# Patient Record
Sex: FEMALE | Race: WHITE
[De-identification: names, ages, dates, MRNs, and addresses within clinical notes are randomized per-mention and may not be internally consistent; named-entity substitution may affect disease eponyms.]

---

## 2018-05-08 ENCOUNTER — HOSPITAL ENCOUNTER (EMERGENCY)
Dept: HOSPITAL 62 - ER | Age: 63
LOS: 1 days | Discharge: ANOTHER HOSPITAL | End: 2018-05-09
Payer: COMMERCIAL

## 2018-05-08 DIAGNOSIS — R11.2: ICD-10-CM

## 2018-05-08 DIAGNOSIS — R19.7: ICD-10-CM

## 2018-05-08 DIAGNOSIS — R79.89: ICD-10-CM

## 2018-05-08 DIAGNOSIS — R10.13: ICD-10-CM

## 2018-05-08 DIAGNOSIS — R10.11: ICD-10-CM

## 2018-05-08 DIAGNOSIS — K80.20: ICD-10-CM

## 2018-05-08 DIAGNOSIS — K85.90: Primary | ICD-10-CM

## 2018-05-08 LAB
ABSOLUTE LYMPHOCYTES# (MANUAL): 1.1 10^3/UL (ref 0.5–4.7)
ABSOLUTE MONOCYTES # (MANUAL): 2.2 10^3/UL (ref 0.1–1.4)
ABSOLUTE NEUTROPHILS# (MANUAL): 24.4 10^3/UL (ref 1.7–8.2)
ADD MANUAL DIFF: YES
ALBUMIN SERPL-MCNC: 4.4 G/DL (ref 3.5–5)
ALP SERPL-CCNC: 111 U/L (ref 38–126)
ALT SERPL-CCNC: 260 U/L (ref 9–52)
ANION GAP SERPL CALC-SCNC: 17 MMOL/L (ref 5–19)
ANISOCYTOSIS BLD QL SMEAR: SLIGHT
APPEARANCE UR: (no result)
APTT PPP: YELLOW S
AST SERPL-CCNC: 546 U/L (ref 14–36)
BASOPHILS NFR BLD MANUAL: 0 % (ref 0–2)
BILIRUB DIRECT SERPL-MCNC: 0.8 MG/DL (ref 0–0.4)
BILIRUB SERPL-MCNC: 1.4 MG/DL (ref 0.2–1.3)
BILIRUB UR QL STRIP: NEGATIVE
BUN SERPL-MCNC: 22 MG/DL (ref 7–20)
CALCIUM: 9.3 MG/DL (ref 8.4–10.2)
CHLORIDE SERPL-SCNC: 104 MMOL/L (ref 98–107)
CO2 SERPL-SCNC: 26 MMOL/L (ref 22–30)
EOSINOPHIL NFR BLD MANUAL: 0 % (ref 0–6)
ERYTHROCYTE [DISTWIDTH] IN BLOOD BY AUTOMATED COUNT: 15.6 % (ref 11.5–14)
GLUCOSE SERPL-MCNC: 286 MG/DL (ref 75–110)
GLUCOSE UR STRIP-MCNC: NEGATIVE MG/DL
HCT VFR BLD CALC: 46.2 % (ref 36–47)
HGB BLD-MCNC: 15 G/DL (ref 12–15.5)
KETONES UR STRIP-MCNC: NEGATIVE MG/DL
LIPASE SERPL-CCNC: (no result) U/L (ref 23–300)
MCH RBC QN AUTO: 27.9 PG (ref 27–33.4)
MCHC RBC AUTO-ENTMCNC: 32.3 G/DL (ref 32–36)
MCV RBC AUTO: 86 FL (ref 80–97)
MONOCYTES % (MANUAL): 8 % (ref 3–13)
NITRITE UR QL STRIP: NEGATIVE
PH UR STRIP: 5 [PH] (ref 5–9)
PLATELET # BLD: 518 10^3/UL (ref 150–450)
PLATELET COMMENT: (no result)
POTASSIUM SERPL-SCNC: 4.5 MMOL/L (ref 3.6–5)
PROT SERPL-MCNC: 7.7 G/DL (ref 6.3–8.2)
PROT UR STRIP-MCNC: >=500 MG/DL
RBC # BLD AUTO: 5.37 10^6/UL (ref 3.72–5.28)
SEGMENTED NEUTROPHILS % (MAN): 88 % (ref 42–78)
SODIUM SERPL-SCNC: 146.9 MMOL/L (ref 137–145)
SP GR UR STRIP: 1.02
TOTAL CELLS COUNTED BLD: 100
TOXIC GRANULES BLD QL SMEAR: SLIGHT
UROBILINOGEN UR-MCNC: NEGATIVE MG/DL (ref ?–2)
VARIANT LYMPHS NFR BLD MANUAL: 4 % (ref 13–45)
WBC # BLD AUTO: 27.7 10^3/UL (ref 4–10.5)

## 2018-05-08 PROCEDURE — 81001 URINALYSIS AUTO W/SCOPE: CPT

## 2018-05-08 PROCEDURE — 87088 URINE BACTERIA CULTURE: CPT

## 2018-05-08 PROCEDURE — 96376 TX/PRO/DX INJ SAME DRUG ADON: CPT

## 2018-05-08 PROCEDURE — 74177 CT ABD & PELVIS W/CONTRAST: CPT

## 2018-05-08 PROCEDURE — 80053 COMPREHEN METABOLIC PANEL: CPT

## 2018-05-08 PROCEDURE — 96361 HYDRATE IV INFUSION ADD-ON: CPT

## 2018-05-08 PROCEDURE — 85025 COMPLETE CBC W/AUTO DIFF WBC: CPT

## 2018-05-08 PROCEDURE — 87186 SC STD MICRODIL/AGAR DIL: CPT

## 2018-05-08 PROCEDURE — 83690 ASSAY OF LIPASE: CPT

## 2018-05-08 PROCEDURE — 74022 RADEX COMPL AQT ABD SERIES: CPT

## 2018-05-08 PROCEDURE — 96365 THER/PROPH/DIAG IV INF INIT: CPT

## 2018-05-08 PROCEDURE — 87086 URINE CULTURE/COLONY COUNT: CPT

## 2018-05-08 PROCEDURE — 99285 EMERGENCY DEPT VISIT HI MDM: CPT

## 2018-05-08 PROCEDURE — 96375 TX/PRO/DX INJ NEW DRUG ADDON: CPT

## 2018-05-08 PROCEDURE — 76705 ECHO EXAM OF ABDOMEN: CPT

## 2018-05-08 PROCEDURE — 87077 CULTURE AEROBIC IDENTIFY: CPT

## 2018-05-08 PROCEDURE — 36415 COLL VENOUS BLD VENIPUNCTURE: CPT

## 2018-05-08 PROCEDURE — 87040 BLOOD CULTURE FOR BACTERIA: CPT

## 2018-05-08 PROCEDURE — 83605 ASSAY OF LACTIC ACID: CPT

## 2018-05-08 NOTE — RADIOLOGY REPORT (SQ)
EXAM DESCRIPTION:  U/S ABDOMEN LIMITED W/O DOP



COMPLETED DATE/TIME:  5/8/2018 7:16 pm



REASON FOR STUDY:  RUQ pain and elevated liver and panc enzymes



COMPARISON:  None.



TECHNIQUE:  Dynamic and static grayscale images acquired of the abdomen and recorded on PACS. Additio
geraldine selected color Doppler and spectral images recorded.



LIMITATIONS:  Overlying bowel gas limits evaluation.



FINDINGS:  PANCREAS: Not seen.

LIVER: 16 cm.  Increased echogenicity.

LIVER VASCULATURE: Normal directional flow of the main portal vein and hepatic veins.

GALLBLADDER: Small gallstones are present.

ULTRASOUND-DETECTED POLLOCK'S SIGN: Negative.

INTRAHEPATIC DUCTS AND COMMON DUCT: CBD and intrahepatic ducts normal caliber. No filling defects.

INFERIOR VENA CAVA: Not well seen.

AORTA: Not well seen.  There is no aneurysm in the midportion of the abdominal aorta.

RIGHT KIDNEY:  Normal size, 11 cm. Normal echogenicity. No solid or suspicious masses. No hydronephro
sis. No calcifications.

PERITONEAL AND RIGHT PLEURAL SPACE: No ascites or effusions.

OTHER: No other significant findings.



IMPRESSION:  Fatty infiltration of the liver.  Cholelithiasis.



TECHNICAL DOCUMENTATION:  JOB ID:  [id number]

 2011 [employer]- All Rights Reserved



Reading location - IP/workstation name: [email or web address]

## 2018-05-08 NOTE — RADIOLOGY REPORT (SQ)
EXAM DESCRIPTION:  ACUTE ABDOMEN SERIES



COMPLETED DATE/TIME:  5/8/2018 6:20 pm



REASON FOR STUDY:  EPIGASTRIC pain, tenderness



COMPARISON:  None.



NUMBER OF VIEWS:  Three views.



TECHNIQUE:  Frontal chest, supine abdomen and upright/ abdomen radiographic images acquired.



LIMITATIONS:  None.



FINDINGS:  CHEST: Lungs clear of infiltrates.

FREE AIR: None. No abnormal gas collections.

BOWEL GAS PATTERN: Nonobstructive pattern. No dilated loops or air fluid levels.

CALCIFICATIONS: 1 cm calcification in the region of the upper pole the left kidney.  Large irregular 
calcification in the right side of the pelvis not likely to be in the ureter.

HARDWARE: None in the abdomen.

SOFT TISSUES: No gross mass or suggestion of organomegaly.

BONES: No acute fracture.  No worrisome bone lesions.

OTHER: No other significant finding.



IMPRESSION:  Possible calyceal calculus in the left kidney.



TECHNICAL DOCUMENTATION:  JOB ID:  [id number]

 2011 "[employer]- All Rights Reserved



Reading location - IP/workstation name: [email or web address]

## 2018-05-08 NOTE — RADIOLOGY REPORT (SQ)
EXAM DESCRIPTION:  CT ABD/PELVIS WITH IV ONLY



COMPLETED DATE/TIME:  5/8/2018 7:34 pm



REASON FOR STUDY:  epigastric pain



COMPARISON:  None.



TECHNIQUE:  CT scan of the abdomen and pelvis performed using helical scanning technique with dynamic
 intravenous contrast injection.  No oral contrast. Images reviewed with lung, soft tissue, and bone 
windows. Reconstructed coronal and sagittal MPR images reviewed. Delayed images for evaluation of the
 urinary system also acquired. All images stored on PACS.

All CT scanners at this facility use dose modulation, iterative reconstruction, and/or weight based d
osing when appropriate to reduce radiation dose to as low as reasonably achievable (ALARA).

[facility]: Dose Right  [facility]: CareDose    [facility]: Dose Right    [facility]: Teradose 4D    [facility]: Smart Technologies



CONTRAST TYPE AND DOSE:  contrast/concentration: Isovue 300.00 mg/ml; Total Contrast Delivered: 100.0
 ml; Total Saline Delivered: 72.0 ml



RENAL FUNCTION:  BUN 22 creatinine 1.5



RADIATION DOSE:  CT Rad equipment meets quality standard of care and radiation dose reduction techniq
ues were employed. CTDIvol: 20.3 - 23.8 mGy. DLP: 2311 mGy-cm..



LIMITATIONS:  None.



FINDINGS:  LOWER CHEST: No significant findings. No nodules or infiltrates.

LIVER: The liver is uniformly low in density.

SPLEEN: Normal size. No focal lesions.

PANCREAS: There is stranding in the peripancreatic fat.  There is some free fluid around the tail of 
the pancreas particularly.  There is edema in the pancreas.

GALLBLADDER: No identified stones by CT criteria. No inflammatory changes to suggest cholecystitis.

ADRENAL GLANDS: No significant masses or asymmetry.

RIGHT KIDNEY AND URETER: The right kidney is hypoplastic.   No significant calcifications.   No hydro
nephrosis or hydroureter.

LEFT KIDNEY AND URETER: No solid masses.   There is a 10 x 12 mm upper calyceal calculus.   No hydron
ephrosis or hydroureter.

AORTA AND VESSELS: No aneurysm. No dissection. Renal arteries, SMA, celiac without stenosis.

RETROPERITONEUM: No retroperitoneal adenopathy, hemorrhage or masses.

BOWEL AND PERITONEAL CAVITY: Diverticulosis coli with no acute inflammatory changes.

APPENDIX: Not identified.

PELVIS: No mass.  No free fluid. Normal bladder.

ABDOMINAL WALL: No masses. No hernias.

BONES: No significant or acute findings.

OTHER: No other significant finding.



IMPRESSION:  1.  Acute pancreatitis with a large amount of peripancreatic and pancreatic edema.  Free
 fluid in the mesentery.

2.  Fatty infiltration of the liver.

3.  Hypoplastic right kidney.

4.  Large upper calyceal calculus in the left kidney.

5.  Diverticulosis coli.



TECHNICAL DOCUMENTATION:  JOB ID:  [id number]

Quality ID # 436: Final reports with documentation of one or more dose reduction techniques (e.g., Au
tomated exposure control, adjustment of the mA and/or kV according to patient size, use of iterative 
reconstruction technique)

 2011 "[employer], [place]"- All Rights Reserved



Reading location - IP/workstation name: [email or web address]

## 2018-05-08 NOTE — ER DOCUMENT REPORT
ED General





<KIARRA SCOTT - Last Filed: 05/09/18 03:27>





- General


TRAVEL OUTSIDE OF THE [place] IN LAST 30 DAYS: No





<SOY BUCKLEY - Last Filed: 05/12/18 07:09>





- General


Chief Complaint: Nausea/Vomiting/Diarrhea


Stated Complaint: VOMITING, NECK/STOMACH PAIN


Time Seen by Provider: 05/08/18 16:26


Notes: 





Patient is a 63-year-old female who was referred to the emergency department by 

her primary care with a chief complaint of nausea, vomiting that started at 4 

AM.  She states that she initially woke up with nausea then developed 

epigastric pain radiating to her right and upper quadrants and into her back.  

She admits to a burning cramping stabbing pain in the epigastric area.  She 

admits to vomiting all day not being able to tolerate p.o.  She denies any 

fevers or chills.  She denies any coffee-ground emesis, hematemesis.  Admits to 

1-2  loose bowel movements.  She denies a history of gallbladder disease.





Past medical history significant for esophageal reflux that she does not take 

any specific medication for it just avoids irritating foods.  History of 

hypertension on lisinopril


Past surgical history significant for previous kidney stone removal


Social history: Denies any tobacco, alcohol or drug use





Primary care is Dr. Bassett (SOY BUCKLEY)





- Related Data


Allergies/Adverse Reactions: 


 





No Known Allergies Allergy (Verified 05/08/18 14:45)


 











Past Medical History





- Social History


Smoking Status: Never Smoker


Family History: Reviewed & Not Pertinent





<SOY BUCKLEY - Last Filed: 05/12/18 07:09>





Review of Systems





- Review of Systems


Constitutional: No symptoms reported


Cardiovascular: No symptoms reported


Respiratory: No symptoms reported


Gastrointestinal: See HPI


Genitourinary: No symptoms reported


Musculoskeletal: No symptoms reported


Neurological/Psychological: No symptoms reported


-: Yes All other systems reviewed and negative





<SOY BUCKLEY - Last Filed: 05/12/18 07:09>





Physical Exam





<KIARRA SCOTT - Last Filed: 05/09/18 03:27>





<SOY BUCKLEY - Last Filed: 05/12/18 07:09>





- Vital signs


Vitals: 


 











Temp Pulse Resp BP Pulse Ox


 


 97.7 F   97   20   101/74   98 


 


 05/08/18 14:48  05/08/18 14:48  05/08/18 14:48  05/08/18 14:48  05/08/18 14:48














- Notes


Notes: 





PHYSICAL EXAM


GENERAL: Alert, irritable due to discomfort but very cooperative. Pain 

tolerable sitting up, worse supine


HEAD: Normocephalic, atraumatic.


EYES: Pupils equal, round, and reactive to light. Extraocular movements intact.


ENT: Oral mucosa moist, tongue midline. 


NECK: Full range of motion. Supple. Trachea midline.


LUNGS: Clear to auscultation bilaterally, no wheezes, rales, or rhonchi. No 

respiratory distress.


HEART: Regular rate and rhythm. No murmurs, gallops, or rubs.


ABDOMEN: Soft,  nondistended, epigastric and RUQ tenderness. No guarding, 

rebound, or rigidity.. Bowel sounds present in all 4 quadrants.


EXTREMITIES: Moves all 4 extremities spontaneously. No edema, radial and 

dorsalis pedis pulses 2/4 bilaterally. No cyanosis. 


NEUROLOGICAL: Alert and oriented x4. Normal speech.


PSYCH: Normal affect, normal mood.


SKIN: Warm, dry, normal turgor. No rashes or lesions noted.


 (SOY BUCKLEY)





Course





- Laboratory


Result Diagrams: 


 05/08/18 16:21





 05/08/18 16:21





<KIARRA SCOTT - Last Filed: 05/09/18 03:27>





- Laboratory


Result Diagrams: 


 05/08/18 16:21





 05/08/18 16:21





<SOY BUCKLEY - Last Filed: 05/12/18 07:09>





- Re-evaluation


Re-evalutation: 








05/08/18 20:15


Ultrasound shows cholelithiasis, CAT scan showing pancreatitis with 

peripancreatic fluid.  Elevated LFTs, significantly elevated lipase, somewhat 

elevated bilirubin.  Concerning for gallstone pancreatitis.  Dr. Bassett called 

me and has already reviewed the results, wants to make sure that I saw them 

will discuss with patient and family for transfer due to lack of GI coverage to 

perform ERCP. Covering with Invanz.  Patient reports she is much improved after 

pain/nausea medication and IV fluids.  She is not tachycardic, hypotensive, or 

febrile.  Remaining labs are still pending.  Requests [place] transfer.  

Transfer center has been contacted, pending call back.





05/08/18 20:45


Spoke with Dr. Whitman, Hospitalist, patient will be accepted for transfer.





Urinalysis shows infection, culture placed, blood culture already has been 

placed, antibiotics have already started infusing.





05/09/18 01:20


Patient has been reevaluated twice, vital signs remain unremarkable with no 

hypotension, tachycardia.  Patient began having a lot of pain again and had to 

be remedicated.  Patient has a bed assignment, transport should be here in 2 

hours.





05/09/18 02:45


EMS team is almost here, patient is alert and well-appearing, has not had any 

vomiting, vital signs unchanged, stable for transport.


 (KIARRA SCOTT)





05/08/18 18:27


Patient is a 63-year-old female who presents with epigastric pain.  Patient is 

afebrile, hemodynamically stable.  She is visibly uncomfortable.  States that 

Toradol, fentanyl not improving her pain.  Patient's CBC did return with a 

leukocytosis of 22.5.  Chemistry was delayed I did have to call for the 

results.  Patient with stable chemistry however her lipase was severely 

elevated that required dilution therefore delay in results being released.  

They have now been released which shows elevations in LFTs and lipase which is 

concerning for common bile duct stone.  She will be sent for a CT the abdomen 

pelvis with IV contrast as well as an ultrasound of the right upper quadrant.  

Patient will be kept n.p.o. at this time.  We will continue to administer IV 

fluids and pain medication.


05/08/18 18:45


KUB without evidence of free air in the abdomen (SOY BUCKLEY)





- Vital Signs


Vital signs: 


 











Temp Pulse Resp BP Pulse Ox


 


 98.8 F   84   20   123/89 H  97 


 


 05/08/18 19:55  05/08/18 19:55  05/09/18 02:31  05/09/18 02:31  05/09/18 02:31














- Laboratory


Laboratory results interpreted by me: 


 











  05/08/18 05/08/18 05/08/18





  16:21 16:21 19:40


 


WBC  27.7 H  


 


RBC  5.37 H  


 


RDW  15.6 H  


 


Plt Count  518 H  


 


Seg Neuts % (Manual)  88 H  


 


Lymphocytes % (Manual)  4 L  


 


Abs Neuts (Manual)  24.4 H  


 


Abs Monocytes (Manual)  2.2 H  


 


Sodium   146.9 H 


 


BUN   22 H 


 


Creatinine   1.51 H 


 


Est GFR ( Amer)   42 L 


 


Est GFR (Non-Af Amer)   35 L 


 


Glucose   286 H 


 


Lactic Acid    3.7 H


 


Total Bilirubin   1.4 H 


 


Direct Bilirubin   0.8 H 


 


AST   546 H 


 


ALT   260 H 


 


Lipase   86037.6 H 


 


Urine Protein   


 


Urine Blood   


 


Ur Leukocyte Esterase   














  05/08/18





  19:58


 


WBC 


 


RBC 


 


RDW 


 


Plt Count 


 


Seg Neuts % (Manual) 


 


Lymphocytes % (Manual) 


 


Abs Neuts (Manual) 


 


Abs Monocytes (Manual) 


 


Sodium 


 


BUN 


 


Creatinine 


 


Est GFR ( Amer) 


 


Est GFR (Non-Af Amer) 


 


Glucose 


 


Lactic Acid 


 


Total Bilirubin 


 


Direct Bilirubin 


 


AST 


 


ALT 


 


Lipase 


 


Urine Protein  >=500 H


 


Urine Blood  MODERATE H


 


Ur Leukocyte Esterase  LARGE H














Discharge





<KIARRA SCOTT - Last Filed: 05/09/18 03:27>





<SOY BUCKLEY - Last Filed: 05/12/18 07:09>





- Discharge


Condition: Stable


Disposition: [facility]


Referrals: 


SHANON BASSETT MD [Primary Care Provider] - Follow up as needed

## 2018-05-09 VITALS — SYSTOLIC BLOOD PRESSURE: 123 MMHG | DIASTOLIC BLOOD PRESSURE: 89 MMHG
